# Patient Record
Sex: MALE | Employment: FULL TIME | URBAN - METROPOLITAN AREA
[De-identification: names, ages, dates, MRNs, and addresses within clinical notes are randomized per-mention and may not be internally consistent; named-entity substitution may affect disease eponyms.]

---

## 2023-01-09 ENCOUNTER — OFFICE VISIT (OUTPATIENT)
Dept: OCCUPATIONAL MEDICINE | Age: 43
End: 2023-01-09

## 2023-01-09 VITALS — DIASTOLIC BLOOD PRESSURE: 88 MMHG | HEART RATE: 99 BPM | SYSTOLIC BLOOD PRESSURE: 130 MMHG | OXYGEN SATURATION: 97 %

## 2023-01-09 DIAGNOSIS — U07.1 COVID-19: Primary | ICD-10-CM

## 2023-01-09 DIAGNOSIS — R52 BODY ACHES: ICD-10-CM

## 2023-01-09 LAB
INFLUENZA A ANTIGEN, POC: NEGATIVE
INFLUENZA B ANTIGEN, POC: NEGATIVE
SARS-COV-2 RNA, POC: POSITIVE

## 2023-01-09 RX ORDER — LISINOPRIL 10 MG/1
10 TABLET ORAL DAILY
COMMUNITY

## 2023-01-09 ASSESSMENT — ENCOUNTER SYMPTOMS
ABDOMINAL PAIN: 0
SINUS PAIN: 0
COUGH: 1
CHEST TIGHTNESS: 0
SINUS PRESSURE: 0
RHINORRHEA: 1
NAUSEA: 0
SHORTNESS OF BREATH: 0
DIARRHEA: 0
VOMITING: 0
SORE THROAT: 1

## 2023-01-09 NOTE — PROGRESS NOTES
PROGRESS NOTE    SUBJECTIVE:   Lynn Miller is a 40 y.o. male seen for URI. Chief Complaint    URI         Mr. Carroll Leary is a 41 y/o male who relays that he started experiencing a fever of 104 with body aches, fatigue, and a cough over the weekend. He took a home COVID test yesterday and it was negative. He relays he is feeling slightly better today. Denies fever today. He has used Tylenol Cold and Robitussin with minimal relief. He denies SOB, difficulty breathing, or chest pain. URI   This is a new problem. The current episode started in the past 7 days. The problem has been gradually improving. The maximum temperature recorded prior to his arrival was 103 - 104 F. The fever has been present for 1 to 2 days. Associated symptoms include congestion, coughing, headaches, joint pain, rhinorrhea and a sore throat. Pertinent negatives include no abdominal pain, chest pain, diarrhea, ear pain, nausea, sinus pain or vomiting. He has tried acetaminophen for the symptoms. The treatment provided mild relief. Current Outpatient Medications   Medication Sig Dispense Refill    lisinopril (PRINIVIL;ZESTRIL) 10 MG tablet Take 10 mg by mouth daily       No current facility-administered medications for this visit. Allergies   Allergen Reactions    Sulfa Antibiotics Angioedema             Review of Systems   Constitutional:  Positive for fatigue and fever. HENT:  Positive for congestion, postnasal drip, rhinorrhea and sore throat. Negative for ear pain, sinus pressure and sinus pain. Respiratory:  Positive for cough. Negative for chest tightness and shortness of breath. Cardiovascular:  Negative for chest pain. Gastrointestinal:  Negative for abdominal pain, diarrhea, nausea and vomiting. Musculoskeletal:  Positive for joint pain and myalgias. Neurological:  Positive for headaches.         OBJECTIVE:  /88 (Site: Left Upper Arm)   Pulse 99   SpO2 97%      Results for orders placed or performed in visit on 01/09/23   AMB POC SARS-COV-2 AND INFLUENZA A/B   Result Value Ref Range    SARS-COV-2 RNA, POC Positive     Influenza A Antigen, POC Negative Negative    Influenza B Antigen, POC Negative Negative       Physical Exam  Constitutional:       Appearance: He is ill-appearing. HENT:      Right Ear: Tympanic membrane and ear canal normal.      Left Ear: Tympanic membrane and ear canal normal.      Nose: Congestion present. Mouth/Throat:      Pharynx: Uvula midline. Posterior oropharyngeal erythema and uvula swelling present. Tonsils: No tonsillar exudate. Cardiovascular:      Rate and Rhythm: Normal rate and regular rhythm. Heart sounds: Normal heart sounds. Pulmonary:      Effort: Pulmonary effort is normal.      Breath sounds: Normal breath sounds. Musculoskeletal:      Cervical back: No tenderness. Lymphadenopathy:      Cervical: No cervical adenopathy. Neurological:      Mental Status: He is alert. ASSESSMENT and PLAN    Mary Laird was seen today for uri. Diagnoses and all orders for this visit:    COVID-19  Comments:  Positive for COVID. Isolation per CDC guidelines reviewed. Work note given. OTC recommendations given. Body aches  -     AMB POC SARS-COV-2 AND INFLUENZA A/B    We discussed the CDC guidelines for isolation and masking. Work excuse given- can return on 1/12 if feeling better with no fever for 24 hours. He was advised to try to not take Tylenol or Ibuprofen on 1/11 and monitor temp. We discussed Vitamin C, D, and Zinc over the next week. He was encouraged to stay hydrated with water- at least 40 oz. Daily. He was instructed he could take Dayquil/Nyquil PRN- no additional Tylenol. He can use Ibuprofen PRN. He can also try honey for the cough. I stated I would call him tomorrow or on Wednesday to check in. I encouraged that he RTC on 1/12 to check in. He verbalized understanding.      Counseled on benefits of having a primary care provider which includes, but is not limited to, continuity of care and having a medical home when concerns arise. Also enforced that onsite clinic policy states that we are not to take the place of a primary care provider, pt verbalized understanding. SEs and risk vs benefits associated with medications prescribed discussed with patient who verbalized understanding. Pt verbalized understanding and agreement with plan of care. RTC for persisting/worsening symptoms or new complaints that arise. Discussed signs and symptoms that would warrant immediate evaluation including, but not limited to HA, blurred vision, speech disturbance, difficulty with ambulation/gait, numbness, tingling, weakness, syncope, chest pain, or shortness of breath. I have reviewed the patient's medication list, past medical, family, social, and surgical history in detail and updated the patient record appropriately.     Steven Wellington, APRJAZMINE - CNP

## 2023-01-11 ENCOUNTER — TELEPHONE (OUTPATIENT)
Dept: OCCUPATIONAL MEDICINE | Age: 43
End: 2023-01-11

## 2023-01-11 NOTE — TELEPHONE ENCOUNTER
Called patient for follow up after he has been at home for COVID- patient relays he is doing better overall. Denies any more fevers. Relays he has minimal body aches. He relays his biggest symptoms are lack of taste/smell. He was informed this could last up to another week or longer. He relayed he will come by the clinic tomorrow AM. Denies any chest pain, SOB, difficulty breathing.

## 2023-01-12 ENCOUNTER — CLINICAL DOCUMENTATION (OUTPATIENT)
Dept: OCCUPATIONAL MEDICINE | Age: 43
End: 2023-01-12

## 2023-01-12 NOTE — PROGRESS NOTES
Patient came into this clinic this AM stating he was feeling an overall weakness and fatigue. He was seen this past week and was positive for COVID-19. He denies any dizziness, lightheadedness, chest pain, SOB, or visual changes. BGL was checked: 97.     Patient was encouraged to stay hydrated. He stated he has been drinking \"plenty of water and Pedialyte\". He was given a token to take Ibuprofen 400 mg PO (he stated he was having generalized back pain). He stated he was getting to go on break and will drink water. He was encouraged to RTC today if symptoms worsen. He verbalized understanding.

## 2023-02-09 ENCOUNTER — CLINICAL DOCUMENTATION (OUTPATIENT)
Dept: OCCUPATIONAL MEDICINE | Age: 43
End: 2023-02-09

## 2023-02-09 NOTE — PROGRESS NOTES
Patient came into the clinic relaying his right upper pectoral area near his right shoulder was hurting. He is seeing the OT on site and had a visit 2 days ago. He was given exercises for home. He messaged the OT this AM and was instructed to come to the clinic. He relays he has noticed the pain has increased since doing those exercises. He has been taking 600 mg Ibuprofen, last dosage was 7 am this morning. He endorses pain with deep breaths. Denies numbness/tingling, weakness. We discussed that this is expected after starting new exercises as he is working that muscle. We discussed that he may be moderately sore for the next 2-3 days since starting these exercises. He was instructed to take 1,000 mg Tylenol now then resume 600 mg of Ibuprofen around 1500 today. Encouraged to rotate medications routinely for the next 2-3 days. He was instructed to continue icing area after exercises but try a heat pack for 10-15 minutes before doing exercises at home. Encouraged to RTC if he starts experiencing numbness/tingling, newfound weakness in arm. Patient verbalized understanding.

## 2024-03-13 ENCOUNTER — TELEPHONE (OUTPATIENT)
Dept: FAMILY MEDICINE CLINIC | Facility: CLINIC | Age: 44
End: 2024-03-13

## 2024-03-13 NOTE — TELEPHONE ENCOUNTER
----- Message from Sanjuana Mckenzie MA sent at 3/13/2024 12:32 PM EDT -----  Subject: Appointment Request    Reason for Call: New Patient/New to Provider Appointment needed: New   Patient Request Appointment    QUESTIONS    Reason for appointment request? Available appointments did not meet   patient need     Additional Information for Provider? Patients wife is calling to get her    set up as a new patient with Dr. Silvestre. They want to see if he can   be fit in sooner than first available which is in September. He prefers   the earliest appt if possible. Please advise   ---------------------------------------------------------------------------  --------------  CALL BACK INFO  [phone]; OK to leave message on voicemail  ---------------------------------------------------------------------------  --------------  SCRIPT ANSWERS

## 2024-06-18 ENCOUNTER — OFFICE VISIT (OUTPATIENT)
Age: 44
End: 2024-06-18

## 2024-06-18 VITALS
SYSTOLIC BLOOD PRESSURE: 122 MMHG | TEMPERATURE: 98.6 F | DIASTOLIC BLOOD PRESSURE: 82 MMHG | OXYGEN SATURATION: 98 % | HEART RATE: 97 BPM | RESPIRATION RATE: 16 BRPM

## 2024-06-18 DIAGNOSIS — L25.9 CONTACT DERMATITIS, UNSPECIFIED CONTACT DERMATITIS TYPE, UNSPECIFIED TRIGGER: Primary | ICD-10-CM

## 2024-06-18 RX ORDER — DAPSONE 100 MG/1
100 TABLET ORAL DAILY
COMMUNITY

## 2024-06-18 RX ORDER — HYDROXYZINE HYDROCHLORIDE 25 MG/1
25 TABLET, FILM COATED ORAL NIGHTLY PRN
COMMUNITY

## 2024-06-18 RX ORDER — NYSTATIN AND TRIAMCINOLONE ACETONIDE 100000; 1 [USP'U]/G; MG/G
1 OINTMENT TOPICAL 2 TIMES DAILY
COMMUNITY

## 2024-06-18 RX ORDER — LOTEPREDNOL ETABONATE 5 MG/ML
2 SUSPENSION/ DROPS OPHTHALMIC DAILY PRN
COMMUNITY
Start: 2022-11-21

## 2024-06-18 RX ORDER — BICTEGRAVIR SODIUM, EMTRICITABINE, AND TENOFOVIR ALAFENAMIDE FUMARATE 50; 200; 25 MG/1; MG/1; MG/1
1 TABLET ORAL DAILY
COMMUNITY
Start: 2021-12-01

## 2024-06-18 RX ORDER — LOSARTAN POTASSIUM AND HYDROCHLOROTHIAZIDE 12.5; 1 MG/1; MG/1
1 TABLET ORAL DAILY
COMMUNITY
Start: 2024-04-25 | End: 2025-04-25

## 2024-06-18 RX ORDER — FEXOFENADINE HCL 180 MG/1
180 TABLET ORAL DAILY
COMMUNITY
Start: 2024-04-17

## 2024-06-18 RX ORDER — OLOPATADINE HYDROCHLORIDE 2 MG/ML
1 SOLUTION/ DROPS OPHTHALMIC DAILY
COMMUNITY
Start: 2023-04-25

## 2024-06-18 RX ORDER — NYSTATIN 100000 [USP'U]/G
1 POWDER TOPICAL 2 TIMES DAILY
COMMUNITY

## 2024-06-18 RX ORDER — MONTELUKAST SODIUM 10 MG/1
10 TABLET ORAL NIGHTLY
COMMUNITY
Start: 2024-05-18

## 2024-06-18 RX ORDER — TRIAMCINOLONE ACETONIDE 1 MG/G
OINTMENT TOPICAL 2 TIMES DAILY
Qty: 30 G | Refills: 0 | Status: SHIPPED | OUTPATIENT
Start: 2024-06-18 | End: 2024-06-25

## 2024-06-18 RX ORDER — AZELASTINE 1 MG/ML
2 SPRAY, METERED NASAL 2 TIMES DAILY
COMMUNITY
Start: 2023-04-25

## 2024-06-18 RX ORDER — DESONIDE 0.5 MG/G
1 OINTMENT TOPICAL 2 TIMES DAILY
COMMUNITY
Start: 2023-02-23

## 2024-06-18 RX ORDER — CLOBETASOL PROPIONATE 0.5 MG/G
1 CREAM TOPICAL 2 TIMES DAILY
COMMUNITY

## 2024-06-18 ASSESSMENT — ENCOUNTER SYMPTOMS
CONSTIPATION: 0
EYE PAIN: 0
ABDOMINAL PAIN: 0
RHINORRHEA: 0
SHORTNESS OF BREATH: 0
CHEST TIGHTNESS: 0
DIARRHEA: 0
NAIL CHANGES: 0
VOMITING: 0
SORE THROAT: 0
NAUSEA: 0
COUGH: 0

## 2024-06-18 NOTE — PROGRESS NOTES
PROGRESS NOTE    SUBJECTIVE     Solange Mabry is a 44 y.o. male seen for:    Chief Complaint    Allergic Reaction         Patient presents to the [facility] with a rash on his neck and right cheek. Patient states it started about an hour ago and there is a burning sensation more so than an itching sensation but he does want to itch it. Patient denies pain over the neck or right cheek. Reviewed patient's medication list and he had many creams prescribed to him over the years, but has not been using most of these creams recently and hasn't had many issues with rashes lately. Patient denies any new medications or foods or exposures to plants, animals or chemicals that might have triggered this reaction. Patient also has bad seasonal allergies but his symptoms have been well controlled recently. Patient took Allergra this morning as he always does for his seasonal allergies. Patient denies any breathing problems or throat/tongue swelling.     Patient has a long history of skin issues and follows closely with a dermatologist though hasn't seen his dermatologist for some time due to a change in his health insurance that prevents him from seeing providers in the [facility], which will resolve in about 2 weeks. Reviewed     Patient last went to his primary care provider (Dr. Keesha Carreon) on 6/6/24 for the following medical conditions: hypertension, HLD, pre-diabetes, and HIV (on Biktarvy). Patient recently had laser treatment for his varicose veins. Patient has plans to do a home sleep study for potential obstructive sleep apnea.    Rash  This is a new problem. The current episode started today. The problem is unchanged. The affected locations include the face and neck. He was exposed to nothing. Pertinent negatives include no anorexia, congestion, cough, diarrhea, eye pain, facial edema, fatigue, fever, joint pain, nail changes, rhinorrhea, shortness of breath, sore throat or

## 2024-06-19 ENCOUNTER — TELEPHONE (OUTPATIENT)
Age: 44
End: 2024-06-19

## 2024-06-19 NOTE — TELEPHONE ENCOUNTER
Called patient to follow up on his symptoms of the rash on his neck. Patient states it is improving and the triamcinolone ointment prescribed yesterday helped. Patient states he is also going to make an appointment with his dermatologist.     Advised patient to follow up if the rash gets worse or is persisting.     Patient verbalized understanding and agreed with the above plan of care.    Advised patient to follow up with the [facility] at [phone] if he needs anything else.     CAROLINA Anand - NP

## 2024-09-04 ENCOUNTER — OFFICE VISIT (OUTPATIENT)
Age: 44
End: 2024-09-04

## 2024-09-04 VITALS
DIASTOLIC BLOOD PRESSURE: 100 MMHG | RESPIRATION RATE: 16 BRPM | HEART RATE: 107 BPM | TEMPERATURE: 99.3 F | OXYGEN SATURATION: 97 % | SYSTOLIC BLOOD PRESSURE: 150 MMHG

## 2024-09-04 DIAGNOSIS — J06.9 UPPER RESPIRATORY TRACT INFECTION, UNSPECIFIED TYPE: Primary | ICD-10-CM

## 2024-09-04 LAB
INFLUENZA A ANTIGEN, POC: NEGATIVE
INFLUENZA B ANTIGEN, POC: NEGATIVE
LOT EXPIRE DATE: NORMAL
LOT KIT NUMBER: NORMAL
SARS-COV-2, POC: NORMAL
VALID INTERNAL CONTROL, POC: YES
VALID INTERNAL CONTROL: YES
VENDOR AND KIT NAME POC: NORMAL

## 2024-09-04 ASSESSMENT — ENCOUNTER SYMPTOMS
WHEEZING: 0
CHEST TIGHTNESS: 0
SORE THROAT: 1
COUGH: 1
SINUS PRESSURE: 0
SINUS PAIN: 0
DIARRHEA: 0
SHORTNESS OF BREATH: 0
VOMITING: 0
NAUSEA: 0
ABDOMINAL PAIN: 0
SWOLLEN GLANDS: 0
RHINORRHEA: 1

## 2024-09-04 NOTE — PROGRESS NOTES
PROGRESS NOTE    SUBJECTIVE     Solange Mabry presents to the [facility] clinic for:    Chief Complaint    Nasal Congestion; Generalized Body Aches         Patient stated his symptoms started on Monday. He took Robitussin and tylenol this morning. He took Nyquil last night at bedtime. He has also been taking Flonase. He traveled to [place] recently.       URI   This is a new problem. The problem has been gradually worsening. There has been no fever. Associated symptoms include congestion, coughing, headaches, rhinorrhea, sneezing and a sore throat. Pertinent negatives include no abdominal pain, chest pain, diarrhea, ear pain, nausea, neck pain, plugged ear sensation, sinus pain, swollen glands, vomiting or wheezing. He has tried acetaminophen (Robitussin) for the symptoms.       Patient presents to the [facility]       Current Outpatient Medications   Medication Sig Dispense Refill    azelastine (ASTELIN) 0.1 % nasal spray 2 sprays by Nasal route 2 times daily      loteprednol (LOTEMAX) 0.5 % ophthalmic suspension Place 2 drops into both eyes daily as needed      bictegravir-emtricitab-tenofovir alafenamide (BIKTARVY) -25 MG TABS per tablet Take 1 tablet by mouth daily      clobetasol (TEMOVATE) 0.05 % cream Apply 1 each topically 2 times daily      Crisaborole 2 % OINT Apply 1 Application topically 2 times daily as needed (itching)      desonide (DESOWEN) 0.05 % ointment Apply 1 Application topically 2 times daily      losartan-hydroCHLOROthiazide (HYZAAR) 100-12.5 MG per tablet Take 1 tablet by mouth daily      montelukast (SINGULAIR) 10 MG tablet Take 1 tablet by mouth nightly      nystatin (MYCOSTATIN) 722308 UNIT/GM powder Apply 1 g topically 2 times daily      nystatin-triamcinolone (MYCOLOG) 998558-0.1 UNIT/GM-% ointment Apply 1 Application topically 2 times daily      olopatadine (PATADAY) 0.2 % SOLN ophthalmic solution Place 1 drop into both eyes daily

## 2024-09-06 ENCOUNTER — TELEPHONE (OUTPATIENT)
Age: 44
End: 2024-09-06

## 2024-09-06 NOTE — TELEPHONE ENCOUNTER
Called patient to follow up on symptoms. Left message for patient to follow up with the [facility] at [phone] if he needs anything else.     CAROLINA Anand - NP

## 2025-08-22 ENCOUNTER — OFFICE VISIT (OUTPATIENT)
Age: 45
End: 2025-08-22

## 2025-08-22 DIAGNOSIS — Z01.10 ENCOUNTER FOR HEARING EXAMINATION WITHOUT ABNORMAL FINDINGS: Primary | ICD-10-CM
